# Patient Record
Sex: FEMALE | Race: BLACK OR AFRICAN AMERICAN | Employment: FULL TIME | URBAN - METROPOLITAN AREA
[De-identification: names, ages, dates, MRNs, and addresses within clinical notes are randomized per-mention and may not be internally consistent; named-entity substitution may affect disease eponyms.]

---

## 2017-11-20 ENCOUNTER — HOSPITAL ENCOUNTER (OUTPATIENT)
Dept: MAMMOGRAPHY | Age: 47
Discharge: HOME | End: 2017-11-20
Attending: OBSTETRICS & GYNECOLOGY
Payer: COMMERCIAL

## 2017-11-20 DIAGNOSIS — Z12.31 VISIT FOR SCREENING MAMMOGRAM: ICD-10-CM

## 2017-11-20 PROCEDURE — 77067 SCR MAMMO BI INCL CAD: CPT

## 2018-11-26 ENCOUNTER — HOSPITAL ENCOUNTER (OUTPATIENT)
Dept: MAMMOGRAPHY | Age: 48
Discharge: HOME | End: 2018-11-26
Attending: OBSTETRICS & GYNECOLOGY
Payer: COMMERCIAL

## 2018-11-26 DIAGNOSIS — Z12.39 ENCOUNTER FOR SCREENING BREAST EXAMINATION: ICD-10-CM

## 2018-11-26 PROCEDURE — 77063 BREAST TOMOSYNTHESIS BI: CPT

## 2020-09-21 ENCOUNTER — HOSPITAL ENCOUNTER (OUTPATIENT)
Dept: MAMMOGRAPHY | Age: 50
Discharge: HOME | End: 2020-09-21
Attending: OBSTETRICS & GYNECOLOGY
Payer: COMMERCIAL

## 2020-09-21 DIAGNOSIS — Z12.31 VISIT FOR SCREENING MAMMOGRAM: ICD-10-CM

## 2020-09-21 PROCEDURE — 77063 BREAST TOMOSYNTHESIS BI: CPT

## 2021-09-08 ENCOUNTER — TRANSCRIBE ORDER (OUTPATIENT)
Dept: SCHEDULING | Age: 51
End: 2021-09-08

## 2021-09-08 DIAGNOSIS — Z12.31 SCREENING MAMMOGRAM FOR HIGH-RISK PATIENT: Primary | ICD-10-CM

## 2021-09-28 ENCOUNTER — HOSPITAL ENCOUNTER (OUTPATIENT)
Dept: MAMMOGRAPHY | Age: 51
Discharge: HOME | End: 2021-09-28
Attending: OBSTETRICS & GYNECOLOGY
Payer: COMMERCIAL

## 2021-09-28 DIAGNOSIS — Z12.31 SCREENING MAMMOGRAM FOR HIGH-RISK PATIENT: ICD-10-CM

## 2021-09-28 PROCEDURE — 77063 BREAST TOMOSYNTHESIS BI: CPT

## 2022-12-12 ENCOUNTER — HOSPITAL ENCOUNTER (OUTPATIENT)
Dept: MAMMOGRAPHY | Age: 52
Discharge: HOME | End: 2022-12-15
Payer: COMMERCIAL

## 2022-12-12 DIAGNOSIS — Z12.31 VISIT FOR SCREENING MAMMOGRAM: ICD-10-CM

## 2022-12-12 PROCEDURE — 77067 SCR MAMMO BI INCL CAD: CPT

## 2023-01-06 NOTE — PROGRESS NOTES
Last pap smear: 12/02/2020 Negative. HPV Negative. Patient has had a hysterectomy. Mammogram: 12/12/2022 BD3  Colonoscopy: 03/16/2022 within normal limits.    Dexa: NA

## 2023-01-09 ENCOUNTER — OFFICE VISIT (OUTPATIENT)
Dept: OBGYN CLINIC | Age: 53
End: 2023-01-09
Payer: COMMERCIAL

## 2023-01-09 VITALS — HEIGHT: 72 IN | BODY MASS INDEX: 29.93 KG/M2 | WEIGHT: 221 LBS

## 2023-01-09 DIAGNOSIS — Z01.419 WELL WOMAN EXAM: Primary | ICD-10-CM

## 2023-01-09 DIAGNOSIS — Z13.89 SCREENING FOR GENITOURINARY CONDITION: ICD-10-CM

## 2023-01-09 DIAGNOSIS — Z12.31 ENCOUNTER FOR SCREENING MAMMOGRAM FOR BREAST CANCER: ICD-10-CM

## 2023-01-09 LAB
BILIRUBIN, URINE, POC: NEGATIVE
BLOOD URINE, POC: NEGATIVE
GLUCOSE URINE, POC: NEGATIVE
KETONES, URINE, POC: NEGATIVE
LEUKOCYTE ESTERASE, URINE, POC: NEGATIVE
NITRITE, URINE, POC: NEGATIVE
PH, URINE, POC: 5.5 (ref 4.6–8)
PROTEIN,URINE, POC: NEGATIVE
SPECIFIC GRAVITY, URINE, POC: 1.02 (ref 1–1.03)
URINALYSIS CLARITY, POC: CLEAR
URINALYSIS COLOR, POC: YELLOW
UROBILINOGEN, POC: NORMAL

## 2023-01-09 PROCEDURE — 81002 URINALYSIS NONAUTO W/O SCOPE: CPT | Performed by: OBSTETRICS & GYNECOLOGY

## 2023-01-09 PROCEDURE — 99396 PREV VISIT EST AGE 40-64: CPT | Performed by: OBSTETRICS & GYNECOLOGY

## 2023-01-09 NOTE — PROGRESS NOTES
2023    Fredy Mendez  1970  Age: 47 y.o. No LMP recorded. Patient has had a hysterectomy.   PCP: Mg Valenzuela MD  Patient does see them for regular preventative visits. HPI: No gyn concerns. Completed her doctorate this . Graduated in December. Doing well. No flowsheet data found. Allergies, medications, past medical and surgical history, social history, family history all reviewed. Review of Systems  Constitutional:  Denies unexplained weight loss or heat or cold intolerance  ENT: Denies change in vision, change in hearing, frequent headaches  Cardiovascular:  Denies chest pain, swelling in legs or feet, shortness of breath when lying flat  Respiratory:  Denies shortness of breath, cough greater than 2 weeks or coughing up blood  Gastro: Denies diarrhea greater than 2 weeks, rectal bleeding, bloody stools, heartburn, or constipation  :  Denies blood in urine, nocturia, dysuria or incontinence  Breast:  Denies nipple discharge, masses or pain  Skin:  Denies rash greater than 2 weeks, change in moles  Musculoskeletal/Neuro:  Denies joint pain, muscle weakness, seizures, loss of balance or frequent falls  Psych:  Denies frequent crying spells or severe anxiety  Heme:  Denies easy bruising, bleeding gums, frequent nosebleeds or swollen lymph nodes  GYN:  Denies bleeding or spotting, pain with sex, see HPI  . Vitals:    23 1022   Weight: 221 lb (100.2 kg)   Height: 6' (1.829 m)       Body mass index is 29.97 kg/m². Pt in no distress. Alert and oriented x3. Affect bright. Well developed, well nourished. HEENT: normocephalic, atraumatic. Sclerae nonicteric. Neck supple w/o thyromegaly. Trachea midline. Heart: regular rate and rhythm, no murmur or gallop  Lungs: clear to auscultation.  Respiratory effort normal.  Breasts: no masses or axillary lymphadenopathy  Abdomen: soft and nontender, no masses, no organomegaly, no ventral hernia  Pelvic: normal external genitalia, urethral meatus, urethra, vagina and cervix. Bimanual exam w/o masses or tenderness. Bladder nontender. Uterus normal size. Adnexae normal.  Perineum and anus normal. No hemorrhoids. Rectovaginal: no masses. Hemocult negative. There is no problem list on file for this patient.          Orders Placed This Encounter   Procedures    ARVIND NATHALIA DIGITAL SCREEN BILATERAL    AMB POC URINALYSIS DIP STICK MANUAL W/O MICRO          Pt counseled about routine screening recommendations      Axel Bernardo MD